# Patient Record
Sex: MALE | Race: WHITE | NOT HISPANIC OR LATINO
[De-identification: names, ages, dates, MRNs, and addresses within clinical notes are randomized per-mention and may not be internally consistent; named-entity substitution may affect disease eponyms.]

---

## 2019-11-27 ENCOUNTER — APPOINTMENT (OUTPATIENT)
Dept: OTOLARYNGOLOGY | Facility: CLINIC | Age: 69
End: 2019-11-27
Payer: COMMERCIAL

## 2019-11-27 VITALS
SYSTOLIC BLOOD PRESSURE: 139 MMHG | DIASTOLIC BLOOD PRESSURE: 77 MMHG | HEART RATE: 56 BPM | HEIGHT: 70 IN | BODY MASS INDEX: 23.62 KG/M2 | WEIGHT: 165 LBS | OXYGEN SATURATION: 98 %

## 2019-11-27 DIAGNOSIS — Z80.9 FAMILY HISTORY OF MALIGNANT NEOPLASM, UNSPECIFIED: ICD-10-CM

## 2019-11-27 DIAGNOSIS — Z85.46 PERSONAL HISTORY OF MALIGNANT NEOPLASM OF PROSTATE: ICD-10-CM

## 2019-11-27 DIAGNOSIS — Z78.9 OTHER SPECIFIED HEALTH STATUS: ICD-10-CM

## 2019-11-27 PROBLEM — Z00.00 ENCOUNTER FOR PREVENTIVE HEALTH EXAMINATION: Status: ACTIVE | Noted: 2019-11-27

## 2019-11-27 PROCEDURE — 99203 OFFICE O/P NEW LOW 30 MIN: CPT

## 2019-11-27 RX ORDER — TADALAFIL 5 MG/1
5 TABLET, FILM COATED ORAL
Refills: 0 | Status: ACTIVE | COMMUNITY

## 2019-11-27 RX ORDER — BIOTIN 10 MG
TABLET ORAL
Refills: 0 | Status: ACTIVE | COMMUNITY

## 2019-11-27 RX ORDER — ASPIRIN 81 MG
81 TABLET, DELAYED RELEASE (ENTERIC COATED) ORAL
Refills: 0 | Status: ACTIVE | COMMUNITY

## 2019-11-27 RX ORDER — VIT A/VIT C/VIT E/ZINC/COPPER 4296-226
CAPSULE ORAL
Refills: 0 | Status: ACTIVE | COMMUNITY

## 2019-11-27 NOTE — PHYSICAL EXAM
[Normal] : no rashes [de-identified] : nontender, mobile, rubbery, well-circumscribed mass located posterior to R SCM, no other masses or lesions noted. [de-identified] : RUE motor strength 5/5, full neck ROM, CN XI intact b/l

## 2019-11-27 NOTE — HISTORY OF PRESENT ILLNESS
[de-identified] : 67 yo healthy, asymptomatic male nonsmoker with hx of prostate cancer s/p prostatectomy a few years ago presents today with a nontender right neck mass x 3-4 months, stable in size. Denies pain, fever, chills, weight loss, fatigue, recent URI/infections, dysphagia, odynophagia, dyspnea, dysphonia, other head and neck masses, shoulder weakness,  limited neck ROM or RUE numbness/tingling.  He mentioned a pimple on the scalp that resolved.  He went to his PCP who ordered US and MRI.\par \par 10/29/19 US: 0.9 x 1.3 x 1.5 cm homogenously echogenic lesion in right side of neck that is nonspecific and not characteristic of a lymph node.\par 11/8/19 MRI neck: 1.3 x 1.2 x 0.7 cm diffusely enhancing soft tissue nodule along the posterior margin of the right SCM muscle which does not have signal characteristics of a lymph node. Possible etiologies include tumor of mesenchymal origin, ectopic minor salivary gland tumor, and neurogenic tumor.

## 2019-11-27 NOTE — REVIEW OF SYSTEMS
[Patient Intake Form Reviewed] : Patient intake form was reviewed [As Noted in HPI] : as noted in HPI [Negative] : Integumentary

## 2019-11-27 NOTE — REASON FOR VISIT
[Initial Evaluation] : an initial evaluation for [Spouse] : spouse [FreeTextEntry2] : right neck mass

## 2019-11-27 NOTE — ASSESSMENT
[FreeTextEntry1] : 68M with a 1.3 cm right posterior neck mass, ddx includes benign tumor vs. low grade malignancy.\par \par Plan:\par - FNA R neck mass.\par - Consider OR excision.\par - Will call pt with cytology results and treatment plan.\par - Pt and wife verbalized understanding of above.

## 2019-12-17 ENCOUNTER — FORM ENCOUNTER (OUTPATIENT)
Age: 69
End: 2019-12-17

## 2019-12-18 ENCOUNTER — RESULT REVIEW (OUTPATIENT)
Age: 69
End: 2019-12-18

## 2019-12-18 ENCOUNTER — APPOINTMENT (OUTPATIENT)
Dept: ULTRASOUND IMAGING | Facility: HOSPITAL | Age: 69
End: 2019-12-18
Payer: COMMERCIAL

## 2019-12-18 ENCOUNTER — OUTPATIENT (OUTPATIENT)
Dept: OUTPATIENT SERVICES | Facility: HOSPITAL | Age: 69
LOS: 1 days | End: 2019-12-18
Payer: COMMERCIAL

## 2019-12-18 PROCEDURE — 76942 ECHO GUIDE FOR BIOPSY: CPT | Mod: 26

## 2019-12-18 PROCEDURE — 88307 TISSUE EXAM BY PATHOLOGIST: CPT | Mod: 26

## 2019-12-18 PROCEDURE — 88173 CYTOPATH EVAL FNA REPORT: CPT

## 2019-12-18 PROCEDURE — 88173 CYTOPATH EVAL FNA REPORT: CPT | Mod: 26

## 2019-12-18 PROCEDURE — 88307 TISSUE EXAM BY PATHOLOGIST: CPT

## 2019-12-18 PROCEDURE — 76942 ECHO GUIDE FOR BIOPSY: CPT

## 2019-12-18 PROCEDURE — 88305 TISSUE EXAM BY PATHOLOGIST: CPT | Mod: 26

## 2019-12-18 PROCEDURE — 20206 BIOPSY MUSCLE PERQ NEEDLE: CPT

## 2019-12-18 PROCEDURE — 88305 TISSUE EXAM BY PATHOLOGIST: CPT

## 2019-12-19 LAB — NON-GYNECOLOGICAL CYTOLOGY STUDY: SIGNIFICANT CHANGE UP

## 2019-12-20 LAB — SURGICAL PATHOLOGY STUDY: SIGNIFICANT CHANGE UP

## 2020-03-16 ENCOUNTER — APPOINTMENT (OUTPATIENT)
Dept: OTOLARYNGOLOGY | Facility: CLINIC | Age: 70
End: 2020-03-16

## 2021-04-14 ENCOUNTER — APPOINTMENT (OUTPATIENT)
Dept: OTOLARYNGOLOGY | Facility: CLINIC | Age: 71
End: 2021-04-14
Payer: COMMERCIAL

## 2021-04-14 VITALS
WEIGHT: 165 LBS | OXYGEN SATURATION: 95 % | HEIGHT: 70 IN | TEMPERATURE: 97.3 F | DIASTOLIC BLOOD PRESSURE: 91 MMHG | BODY MASS INDEX: 23.62 KG/M2 | SYSTOLIC BLOOD PRESSURE: 162 MMHG | HEART RATE: 54 BPM

## 2021-04-14 DIAGNOSIS — R22.1 LOCALIZED SWELLING, MASS AND LUMP, NECK: ICD-10-CM

## 2021-04-14 PROCEDURE — 99072 ADDL SUPL MATRL&STAF TM PHE: CPT

## 2021-04-14 PROCEDURE — 99212 OFFICE O/P EST SF 10 MIN: CPT

## 2021-04-15 PROBLEM — R22.1 NECK MASS: Status: ACTIVE | Noted: 2019-11-27

## 2021-04-15 NOTE — ASSESSMENT
[FreeTextEntry1] : 70M with a right neck lipoma, mildly enlarged since 2019, but otherwise asymptomatic.\par  \par Plan:\par - No intervention at this time.  Miniscule risk of transformation to malignancy.\par - If he becomes symptomatic, then would consider surgical excision.\par - RTC prn should worrisome symptoms develop.\par - Pt and wife verbalized understanding of above. not tested

## 2021-04-15 NOTE — HISTORY OF PRESENT ILLNESS
[de-identified] : 71 yo healthy, asymptomatic male nonsmoker with hx of prostate cancer s/p prostatectomy a few years ago presents today with a nontender right neck mass x 3-4 months, stable in size. Denies pain, fever, chills, weight loss, fatigue, recent URI/infections, dysphagia, odynophagia, dyspnea, dysphonia, other head and neck masses, shoulder weakness, limited neck ROM or RUE numbness/tingling. He mentioned a pimple on the scalp that resolved. He went to his PCP who ordered US and MRI.\par \par 10/29/19 US: 0.9 x 1.3 x 1.5 cm homogenously echogenic lesion in right side of neck that is nonspecific and not characteristic of a lymph node.\par \par 11/8/19 MRI neck: 1.3 x 1.2 x 0.7 cm diffusely enhancing soft tissue nodule along the posterior margin of the right SCM muscle which does not have signal characteristics of a lymph node. Possible etiologies include tumor of mesenchymal origin, ectopic minor salivary gland tumor, and neurogenic tumor. \par \par 12/18/2019 Core biopsy: lipoma, favor spindle cell type.\par \par  [FreeTextEntry1] : Pt returns today, reports the mass has enlarged over the past 2 months, otherwise he is asymptomatic.  Denies fever, chills, weight loss, fatigue, pain.

## 2021-04-15 NOTE — REASON FOR VISIT
[Subsequent Evaluation] : a subsequent evaluation for [Spouse] : spouse [FreeTextEntry2] : right neck mass

## 2021-04-15 NOTE — PHYSICAL EXAM
[Normal] : no rashes [de-identified] : nontender, mobile, rubbery, well-circumscribed mass approximately 1.5-2 cm located posterior to R SCM, no other masses or lesions noted. [de-identified] : full neck ROM, CN XI intact b/l

## 2023-12-14 NOTE — REVIEW OF SYSTEMS
[As Noted in HPI] : as noted in HPI [Negative] : Heme/Lymph L Brow Units: 0 Show Mentalis Units: No Dilution (U/0.1 Cc): 4 Show Glabellar Units: Yes Additional Area 2 Location: lower vermillion lip Periorbital Skin Units: 12 Additional Area 1 Location: lateral brow Incrementing Botox Units: By 0.5 Units Detail Level: Detailed Consent: Written consent obtained. Risks include but not limited to lid/brow ptosis, bruising, swelling, diplopia, temporary effect, incomplete chemical denervation. Glabellar Complex Units: 20 Show Inventory Tab: Hide Post-Care Instructions: Patient instructed to not lie down for 4 hours and limit physical activity for 24 hours.